# Patient Record
Sex: FEMALE | Race: WHITE | Employment: OTHER | URBAN - METROPOLITAN AREA
[De-identification: names, ages, dates, MRNs, and addresses within clinical notes are randomized per-mention and may not be internally consistent; named-entity substitution may affect disease eponyms.]

---

## 2017-01-03 PROCEDURE — 81001 URINALYSIS AUTO W/SCOPE: CPT | Performed by: INTERNAL MEDICINE

## 2017-01-03 PROCEDURE — 87086 URINE CULTURE/COLONY COUNT: CPT | Performed by: INTERNAL MEDICINE

## 2017-09-22 PROCEDURE — 84425 ASSAY OF VITAMIN B-1: CPT | Performed by: INTERNAL MEDICINE

## 2017-09-22 PROCEDURE — 82607 VITAMIN B-12: CPT | Performed by: INTERNAL MEDICINE

## 2017-09-23 PROBLEM — D51.3 OTHER DIETARY VITAMIN B12 DEFICIENCY ANEMIA: Status: ACTIVE | Noted: 2017-09-23

## 2017-11-22 PROCEDURE — 83540 ASSAY OF IRON: CPT | Performed by: INTERNAL MEDICINE

## 2017-11-22 PROCEDURE — 84425 ASSAY OF VITAMIN B-1: CPT | Performed by: INTERNAL MEDICINE

## 2017-11-22 PROCEDURE — 82607 VITAMIN B-12: CPT | Performed by: INTERNAL MEDICINE

## 2017-11-22 PROCEDURE — 83550 IRON BINDING TEST: CPT | Performed by: INTERNAL MEDICINE

## 2018-01-08 PROCEDURE — 82607 VITAMIN B-12: CPT | Performed by: INTERNAL MEDICINE

## 2018-04-18 PROBLEM — G31.84 MILD COGNITIVE IMPAIRMENT: Status: ACTIVE | Noted: 2018-04-18

## 2018-04-18 PROCEDURE — 82607 VITAMIN B-12: CPT | Performed by: INTERNAL MEDICINE

## 2021-02-09 PROBLEM — I67.9 CEREBRAL VASCULAR DISEASE: Status: ACTIVE | Noted: 2021-02-09

## 2021-02-09 PROBLEM — F01.51 MIXED ALZHEIMER'S AND VASCULAR DEMENTIA WITH BEHAVIOR DISTURBANCES (HCC): Status: ACTIVE | Noted: 2021-02-09

## 2021-02-09 PROBLEM — G30.9 MIXED ALZHEIMER'S AND VASCULAR DEMENTIA WITH BEHAVIOR DISTURBANCES (HCC): Status: ACTIVE | Noted: 2021-02-09

## 2021-08-20 PROBLEM — E46 PROTEIN-CALORIE MALNUTRITION, UNSPECIFIED SEVERITY (HCC): Status: ACTIVE | Noted: 2021-08-20

## 2021-11-08 ENCOUNTER — APPOINTMENT (OUTPATIENT)
Dept: CT IMAGING | Facility: HOSPITAL | Age: 81
DRG: 064 | End: 2021-11-08
Attending: EMERGENCY MEDICINE
Payer: MEDICARE

## 2021-11-08 ENCOUNTER — HOSPITAL ENCOUNTER (INPATIENT)
Facility: HOSPITAL | Age: 81
LOS: 6 days | Discharge: HOME HEALTH | DRG: 064 | End: 2021-11-14
Attending: EMERGENCY MEDICINE | Admitting: HOSPITALIST
Payer: MEDICARE

## 2021-11-08 DIAGNOSIS — I62.9 ACUTE IDIOPATHIC SPONTANEOUS INTRAPARENCHYMAL INTRACRANIAL HEMORRHAGE (HCC): Primary | ICD-10-CM

## 2021-11-08 PROCEDURE — 70450 CT HEAD/BRAIN W/O DYE: CPT | Performed by: EMERGENCY MEDICINE

## 2021-11-08 RX ORDER — BISACODYL 10 MG
10 SUPPOSITORY, RECTAL RECTAL
Status: DISCONTINUED | OUTPATIENT
Start: 2021-11-08 | End: 2021-11-14

## 2021-11-08 RX ORDER — HYDRALAZINE HYDROCHLORIDE 20 MG/ML
10 INJECTION INTRAMUSCULAR; INTRAVENOUS EVERY 4 HOURS PRN
Status: DISCONTINUED | OUTPATIENT
Start: 2021-11-08 | End: 2021-11-14

## 2021-11-08 RX ORDER — LORAZEPAM 2 MG/ML
0.5 INJECTION INTRAMUSCULAR ONCE
Status: COMPLETED | OUTPATIENT
Start: 2021-11-08 | End: 2021-11-08

## 2021-11-08 RX ORDER — POLYETHYLENE GLYCOL 3350 17 G/17G
17 POWDER, FOR SOLUTION ORAL DAILY PRN
Status: DISCONTINUED | OUTPATIENT
Start: 2021-11-08 | End: 2021-11-14

## 2021-11-08 RX ORDER — SODIUM CHLORIDE 9 MG/ML
INJECTION, SOLUTION INTRAVENOUS ONCE
Status: COMPLETED | OUTPATIENT
Start: 2021-11-08 | End: 2021-11-08

## 2021-11-08 RX ORDER — MEMANTINE HYDROCHLORIDE 10 MG/1
10 TABLET ORAL 2 TIMES DAILY
Status: DISCONTINUED | OUTPATIENT
Start: 2021-11-09 | End: 2021-11-14

## 2021-11-08 RX ORDER — MIRTAZAPINE 30 MG/1
30 TABLET, FILM COATED ORAL NIGHTLY
Status: DISCONTINUED | OUTPATIENT
Start: 2021-11-08 | End: 2021-11-14

## 2021-11-08 RX ORDER — LISINOPRIL 20 MG/1
10 TABLET ORAL ONCE
Status: DISCONTINUED | OUTPATIENT
Start: 2021-11-08 | End: 2021-11-09

## 2021-11-08 RX ORDER — LISINOPRIL 10 MG/1
10 TABLET ORAL DAILY
Status: DISCONTINUED | OUTPATIENT
Start: 2021-11-09 | End: 2021-11-09

## 2021-11-08 RX ORDER — MEMANTINE HYDROCHLORIDE 10 MG/1
10 TABLET ORAL ONCE
Status: DISCONTINUED | OUTPATIENT
Start: 2021-11-08 | End: 2021-11-14

## 2021-11-08 RX ORDER — METOCLOPRAMIDE HYDROCHLORIDE 5 MG/ML
5 INJECTION INTRAMUSCULAR; INTRAVENOUS EVERY 8 HOURS PRN
Status: DISCONTINUED | OUTPATIENT
Start: 2021-11-08 | End: 2021-11-14

## 2021-11-08 RX ORDER — CHOLECALCIFEROL (VITAMIN D3) 125 MCG
1000 CAPSULE ORAL DAILY
Status: DISCONTINUED | OUTPATIENT
Start: 2021-11-09 | End: 2021-11-14

## 2021-11-08 RX ORDER — RIVASTIGMINE 13.3 MG/24H
1 PATCH, EXTENDED RELEASE TRANSDERMAL
Status: DISCONTINUED | OUTPATIENT
Start: 2021-11-08 | End: 2021-11-14

## 2021-11-08 RX ORDER — ACETAMINOPHEN 325 MG/1
650 TABLET ORAL EVERY 6 HOURS PRN
Status: DISCONTINUED | OUTPATIENT
Start: 2021-11-08 | End: 2021-11-14

## 2021-11-08 RX ORDER — ONDANSETRON 2 MG/ML
4 INJECTION INTRAMUSCULAR; INTRAVENOUS EVERY 6 HOURS PRN
Status: DISCONTINUED | OUTPATIENT
Start: 2021-11-08 | End: 2021-11-14

## 2021-11-08 RX ORDER — HALOPERIDOL 5 MG/ML
1 INJECTION INTRAMUSCULAR EVERY 4 HOURS PRN
Status: DISCONTINUED | OUTPATIENT
Start: 2021-11-08 | End: 2021-11-14

## 2021-11-08 RX ORDER — ROSUVASTATIN CALCIUM 5 MG/1
5 TABLET, COATED ORAL NIGHTLY
Status: DISCONTINUED | OUTPATIENT
Start: 2021-11-08 | End: 2021-11-14

## 2021-11-08 RX ORDER — ALPRAZOLAM 0.25 MG/1
0.25 TABLET ORAL 3 TIMES DAILY PRN
Status: DISCONTINUED | OUTPATIENT
Start: 2021-11-08 | End: 2021-11-14

## 2021-11-08 NOTE — ED INITIAL ASSESSMENT (HPI)
Génesis Rivera is here via EMS for AMS. Home health RN called for AMS and shaking. Patient is typically A&Ox1 but was alert to verbal per EMS initially. More responsive now. She seems agitated and is unable to provide and history.

## 2021-11-08 NOTE — ED PROVIDER NOTES
Patient Seen in: [facility] AND [facility] Emergency Department      History   Patient presents with:  Altered Mental Status    Stated Complaint: AMS    Subjective:   HPI    57-year-old female with dementia presents for evaluation for altered mental status.   Thuy Palumbo Appearance: She is well-developed. HENT:      Head: Normocephalic and atraumatic. Mouth/Throat:      Mouth: Mucous membranes are dry. Eyes:      General: Lids are normal.      Extraocular Movements: Extraocular movements intact.    Cardiovascular: (*)     Alkaline Phosphatase 278 (*)     Albumin 3.1 (*)     All other components within normal limits   CBC W/ DIFFERENTIAL - Abnormal; Notable for the following components:    HGB 11.9 (*)     RDW-SD 47.8 (*)     Lymphocyte Absolute 0.70 (*)     All othe extra-axial fluid collection. CEREBRUM: Evolving subacute intraparenchymal hemorrhage is suggested in the posterior right temporal lobe, measuring 2.0 x 3.1 x 1.8 cm. Surrounding vasogenic edema is seen, and cortical sulcal effacement is apparent.  There is this examination were discussed with the patient's physician, Dr. Saray Hwang, by Dr. Anurag Cuba at 95 20 92 on 11/08/2021.    Dictated by (CST): Heena Anderson MD on 11/08/2021 at 11:46 AM     Finalized by (CST): Heena Anderson MD on 11/08/2021 at 11:50 AM

## 2021-11-08 NOTE — H&P
[facility] Hospitalist H&P     CC: Patient presents with:  Altered Mental Status       PCP: Janet Junior MD      Assessment and Plan     Susannah Montiel is a 80year old female with PMH sig for HTN, HL, dementia (oriented to preson, family at times) who present PMH  Past Medical History:   Diagnosis Date   • Essential hypertension    • HYPERLIPIDEMIA    • OTHER DISEASES     vertigo        PSH  Past Surgical History:   Procedure Laterality Date   • BENIGN BIOPSY LEFT  1996   • OTHER SURGICAL HISTORY  2004    l 11/8/2021  CONCLUSION:  1. Acute/subacute intraparenchymal hemorrhage is seen in the right temporal lobe with surrounding vasogenic edema. This could reflect a hemorrhagic contusion or, potentially, intralesional hemorrhage of an underlying mass.  Close fol

## 2021-11-09 ENCOUNTER — APPOINTMENT (OUTPATIENT)
Dept: CT IMAGING | Facility: HOSPITAL | Age: 81
DRG: 064 | End: 2021-11-09
Attending: HOSPITALIST
Payer: MEDICARE

## 2021-11-09 PROCEDURE — 95816 EEG AWAKE AND DROWSY: CPT | Performed by: OTHER

## 2021-11-09 PROCEDURE — 70450 CT HEAD/BRAIN W/O DYE: CPT | Performed by: HOSPITALIST

## 2021-11-09 PROCEDURE — 99223 1ST HOSP IP/OBS HIGH 75: CPT | Performed by: OTHER

## 2021-11-09 RX ORDER — METOPROLOL TARTRATE 5 MG/5ML
5 INJECTION INTRAVENOUS EVERY 6 HOURS
Status: DISCONTINUED | OUTPATIENT
Start: 2021-11-09 | End: 2021-11-09

## 2021-11-09 RX ORDER — LISINOPRIL 20 MG/1
20 TABLET ORAL DAILY
Status: DISCONTINUED | OUTPATIENT
Start: 2021-11-09 | End: 2021-11-14

## 2021-11-09 RX ORDER — SODIUM CHLORIDE 9 MG/ML
INJECTION, SOLUTION INTRAVENOUS CONTINUOUS
Status: DISCONTINUED | OUTPATIENT
Start: 2021-11-09 | End: 2021-11-09

## 2021-11-09 NOTE — PLAN OF CARE
Patient Aox1. Agitated. Vitals stable. New admit. Neuro checks. At baseline. Just more agitated. Haldol given.      Problem: Patient Centered Care  Goal: Patient preferences are identified and integrated in the patient's plan of care  Description: Enid Schwartz secretions as needed  - Reorient patient post seizure  - Seizure pads on all 4 side rails  - Instruct patient/family to notify RN of any seizure activity  - Instruct patient/family to call for assistance with activity based on assessment  Outcome: Progress

## 2021-11-09 NOTE — PROGRESS NOTES
[facility] Hospitalist Progress Note     PCP: Maritza Hwang MD    CC: Follow up       Assessment/Plan:     Crow London is a 80year old female with PMH sig for HTN, HL, dementia (oriented to preson, family at times) who presented with worsening MS, aggitatio Bere Boucher on the phone on 11/9/21. Requested hospice consult which is appropriate.   Plan would be for home hospice     Thank You,  Marcos Lopez MD  11/9/21   Hospitalist  [facility]   Answering service: [phone]          Subjective     Alert 11.9* 10.5*   MCV 92.2 93.2   .0 216.0       Recent Labs   Lab 11/08/21  0959 11/09/21  0432    145   K 4.2 3.9    116*   CO2 23.0 25.0   BUN 21* 15   CREATSERUM 0.88 0.63   CA 9.8 9.1   MG 2.2  --    * 91       Recent Labs   Lab

## 2021-11-09 NOTE — PLAN OF CARE
Bilateral wrist restraints in place, ROM completed every two hours.       Problem: Safety Risk - Non-Violent Restraints  Goal: Patient will remain free from self-harm  Description: INTERVENTIONS:  - Apply the least restrictive restraint to prevent harm  - N

## 2021-11-09 NOTE — PLAN OF CARE
Patient received in restless state, attempting to get up, remove medical equipment and very confused (per baseline). Patient placed in restraints during shift. Bilateral wrist restraints in place, ROM completed every two hours.  Neuro checks performed Q 4 a administer oxygen as ordered  - Monitor patient for seizure activity, document and report duration and description of seizure to MD/LIP  - If seizure occurs, turn patient to side and suction secretions as needed  - Reorient patient post seizure  - Seizure

## 2021-11-09 NOTE — PHYSICAL THERAPY NOTE
PHYSICAL THERAPY EVALUATION - INPATIENT     Room Number: 239/239-A  Evaluation Date: 11/9/2021  Type of Evaluation: Initial   Physician Order: PT Eval and Treat    Presenting Problem: intraparenchymal hemorrhage (non-operative)     Reason for Therapy:  [facility] '6 clicks' Inpatient Basic Mobility Short Form. Research supports that patients with this level of impairment may benefit from home with 24 hour care and HHPT.         DISCHARGE RECOMMENDATIONS  PT Discharge Recommendations: 24 hour care/supervision; Status:  Impaired  · Orientation Level:  oriented to person  · Memory:  decreased long term memory and decreased short term memory  · Following Commands:  follows one step commands with increased time and follows one step commands with repetition  · Proble steps along EOB  Assistive Device:  (bilateral HHA)  Pattern: Shuffle (flexed posture, tremors)    Bed Mobility: Mod A    Transfers: Min HHA of 2    Exercise/Education Provided:  Bed mobility  Functional activity tolerated  Gait training  Transfer training

## 2021-11-09 NOTE — CONSULTS
Neurosurgery Consult Note    _______________________ PATIENT HISTORY _______________________    CC: Cerebral hemorrhage    HPI: A neurosurgery consult was requested by Dr Sondra Anaya.   The patient is a 81yo WF who was brought to the hospital with altered men Year: Not on file  Transportation Needs:       Lack of Transportation (Medical): Not on file      Lack of Transportation (Non-Medical):  Not on file  Physical Activity:       Days of Exercise per Week: Not on file      Minutes of Exercise per Session: Not o mouth daily. , Disp: , Rfl: 0  Vitamin B-12 1000 MCG Oral Tab, Take 1 tablet (1,000 mcg total) by mouth daily. , Disp: , Rfl: 0  Cholecalciferol (VITAMIN D) 1000 units Oral Tab, Take 1 tablet by mouth daily. , Disp: 30 tablet, Rfl: 0                __________ MCHC 31.5 31.7   RDW 14.0 14.2   NEPRELIM 5.54 4.73   WBC 6.4 6.9   .0 216.0       No results for input(s): PT, INR, PTT in the last 168 hours.       DIAGNOSES:    Right temporal subacute hemorrhage        ASSESSMENT & PLAN:    The patient has abdifatah

## 2021-11-09 NOTE — PROCEDURES
EEG report    REFERRING PHYSICIAN: Mateo Sanchez MD    PCP and phone number:  Quinten Diaz MD  [phone]    TECHNIQUE: 21 channels of EEG, 2 channels of EOG, and 1 channel of EKG were recorded utilizing the International 10/20 System.  The recor

## 2021-11-09 NOTE — CONSULTS
[facility]    Report of Consultation    Lura Barthel Patient Status:  Inpatient    3/6/1940 MRN [id number]   Location [facility] 2W/SW Attending Jakob Pacheco MD   [facility] Day # 1 PCP Shannon Silverman MD     Date of Admission:  6 vertigo       Past Surgical History  Past Surgical History:   Procedure Laterality Date   • BENIGN BIOPSY LEFT  1996   • OTHER SURGICAL HISTORY  2004    left shoulder replacement   • OTHER SURGICAL HISTORY  08/09/10    Mohs sx Left Restoration bx proven BCC NEEDED  lisinopril 10 MG Oral Tab, Take 1 tablet (10 mg total) by mouth daily. mirtazapine 30 MG Oral Tab, Take 1 tablet (30 mg total) by mouth nightly. rivastigmine (EXELON) 13.3 MG/24HR Transdermal Patch 24 Hr, Place 1 patch onto the skin 1 DAY.   Rosuv symmetrically.       Results:     Laboratory Data:  Lab Results   Component Value Date    WBC 6.9 11/09/2021    HGB 10.5 (L) 11/09/2021    HCT 33.1 (L) 11/09/2021    .0 11/09/2021    CREATSERUM 0.63 11/09/2021    BUN 15 11/09/2021     11/09/202 with systolic below 702. Supportive management. I discussed the case with the daughter on the phone.   Depending on the food intake ability might consider treatment of palliative care in the future        Thank you for allowing me to participate in the ca

## 2021-11-09 NOTE — OCCUPATIONAL THERAPY NOTE
OCCUPATIONAL THERAPY EVALUATION - INPATIENT     Room Number: 239/239-A  Evaluation Date: 11/9/2021  Type of Evaluation: Initial  Presenting Problem: intraparenchymal hemorrhage    Physician Order: IP Consult to Occupational Therapy  Reason for Therapy: ADL place and alarm on.  Recommendation at this time is for return to home w/ 24/7 supervision and adssist      The patient's Approx Degree of Impairment: 59.67% has been calculated based on documentation in the [facility] '6 clicks' Inpatient Daily Activity Short F 1    Communication: intelligible, but disjointed in context/content    Behavioral/Emotional/Social: pleasant    RANGE OF MOTION   Upper extremity ROM is within functional limits     STRENGTH ASSESSMENT  Upper extremity strength is within functional limits 11/16/21  Frequency: 3x/week

## 2021-11-09 NOTE — CM/SW NOTE
11/09/21 1100   CM/SW Referral Data   Referral Source Physician   Reason for Referral Discharge planning   Informant Daughter   Pertinent Medical Hx   Does patient have an established PCP? Yes   Patient Info   Advanced directives?  Yes   Number of Levels spouse has advanced Parkinson's Disease and is unable to serve as her HCPOA. Bere Sander is listed as the [facility] health care agents.    HCPOA/DTR - Юлия Melendez: [phone]    PLAN: pending medical course, therapy recs, SNF (acceptance/choice)    @4:00PM  SW

## 2021-11-09 NOTE — SLP NOTE
ADULT SWALLOWING EVALUATION    ASSESSMENT    ASSESSMENT/OVERALL IMPRESSION:  PPE REQUIRED. THIS THERAPIST WORE GLOVES, DROPLET MASK, AND GOGGLES FOR DURATION OF EVALUATION. HANDS WASHED UPON ENTRANCE/EXIT. SLP BSSE orders received and acknowledged.  LORIE luciano consistencies;Small bites and sips;Multiple swallows  Aspiration Precautions: Upright position; Slow rate;Small bites and sips; No straw  Medication Administration Recommendations: One pill at a time  Treatment Plan/Recommendations: Aspiration precautions  D of Motion:  (reduced)  Rate of Motion: Reduced    Voice Quality: Clear  Respiratory Status: Supplemental O2;Nasal cannula  Consistencies Trialed: Nectar thick liquids;Puree  Method of Presentation: Staff/Clinician assistance;Spoon;Cup;Single sips  Patient 11/10/21    Thank you for your referral.   If you have any questions, please contact Brittany Vieira, NORBERTO Estrada  Speech Language Pathologist  Phone Number [phone]. [phone]

## 2021-11-10 PROCEDURE — 99232 SBSQ HOSP IP/OBS MODERATE 35: CPT | Performed by: OTHER

## 2021-11-10 RX ORDER — LORAZEPAM 2 MG/ML
1 INJECTION INTRAMUSCULAR
Status: DISCONTINUED | OUTPATIENT
Start: 2021-11-10 | End: 2021-11-14

## 2021-11-10 RX ORDER — POLYETHYLENE GLYCOL 3350 17 G/17G
17 POWDER, FOR SOLUTION ORAL DAILY PRN
Status: DISCONTINUED | OUTPATIENT
Start: 2021-11-10 | End: 2021-11-14

## 2021-11-10 RX ORDER — LABETALOL HYDROCHLORIDE 5 MG/ML
10 INJECTION, SOLUTION INTRAVENOUS EVERY 10 MIN PRN
Status: DISCONTINUED | OUTPATIENT
Start: 2021-11-10 | End: 2021-11-14

## 2021-11-10 RX ORDER — MORPHINE SULFATE 2 MG/ML
1 INJECTION, SOLUTION INTRAMUSCULAR; INTRAVENOUS EVERY 2 HOUR PRN
Status: DISCONTINUED | OUTPATIENT
Start: 2021-11-10 | End: 2021-11-14

## 2021-11-10 RX ORDER — ACETAMINOPHEN 325 MG/1
650 TABLET ORAL EVERY 4 HOURS PRN
Status: DISCONTINUED | OUTPATIENT
Start: 2021-11-10 | End: 2021-11-14

## 2021-11-10 RX ORDER — BISACODYL 10 MG
10 SUPPOSITORY, RECTAL RECTAL
Status: DISCONTINUED | OUTPATIENT
Start: 2021-11-10 | End: 2021-11-14

## 2021-11-10 RX ORDER — ACETAMINOPHEN 650 MG/1
650 SUPPOSITORY RECTAL EVERY 4 HOURS PRN
Status: DISCONTINUED | OUTPATIENT
Start: 2021-11-10 | End: 2021-11-14

## 2021-11-10 RX ORDER — SODIUM CHLORIDE 9 MG/ML
INJECTION, SOLUTION INTRAVENOUS CONTINUOUS
Status: DISCONTINUED | OUTPATIENT
Start: 2021-11-10 | End: 2021-11-10

## 2021-11-10 RX ORDER — MORPHINE SULFATE 2 MG/ML
2 INJECTION, SOLUTION INTRAMUSCULAR; INTRAVENOUS EVERY 2 HOUR PRN
Status: DISCONTINUED | OUTPATIENT
Start: 2021-11-10 | End: 2021-11-14

## 2021-11-10 RX ORDER — SODIUM PHOSPHATE, DIBASIC AND SODIUM PHOSPHATE, MONOBASIC 7; 19 G/133ML; G/133ML
1 ENEMA RECTAL ONCE AS NEEDED
Status: DISCONTINUED | OUTPATIENT
Start: 2021-11-10 | End: 2021-11-14

## 2021-11-10 RX ORDER — DOCUSATE SODIUM 100 MG/1
100 CAPSULE, LIQUID FILLED ORAL 2 TIMES DAILY
Status: DISCONTINUED | OUTPATIENT
Start: 2021-11-10 | End: 2021-11-14

## 2021-11-10 RX ORDER — HYDRALAZINE HYDROCHLORIDE 20 MG/ML
10 INJECTION INTRAMUSCULAR; INTRAVENOUS EVERY 2 HOUR PRN
Status: DISCONTINUED | OUTPATIENT
Start: 2021-11-10 | End: 2021-11-14

## 2021-11-10 NOTE — PROGRESS NOTES
Double RN skin check done prior to transfer off unit. Skin check performed by this RN and Citlaly. Patient is free of wounds, a protective Aquacel was p[laced over sacrum, receiving RN, Reny Harding, notified in report.  Will remain available for any questions or conc

## 2021-11-10 NOTE — PLAN OF CARE
Patient alert and calm. Generalized tremors. Oriented to person, disoriented to time, place, and situation. Protective sacral mepilex in place. Patient became anxious and agitated towards end of shift, haldol provided.   Restraints in place throughout s Risk - Non-Violent Restraints  Goal: Patient will remain free from self-harm  Description: INTERVENTIONS:  - Apply the least restrictive restraint to prevent harm  - Notify patient and family of reasons restraints applied  - Assess for any contributing fac

## 2021-11-10 NOTE — CM/SW NOTE
SW received MDO for HH eval. SW initiated tentative [facility] referrals. SW to enter F2F if pt/family chooses HH. Of note, [facility] plans to call pt's daughter/POA today @ 11:30AM to discuss hospice.     @12:24 PM  Per Dhara Holley with [facility]

## 2021-11-10 NOTE — PROGRESS NOTES
[facility] Hospitalist Progress Note     PCP: Basil Isabel MD    CC: Follow up       Assessment/Plan:     Sawyer Cruz is a 80year old female with PMH sig for HTN, HL, dementia (oriented to preson, family at times) who presented with worsening MS, aggitatio MD  [facility] [address] [facility]   Answering service: [phone]          Subjective     Alert, confused,   Calm this morning, denies pain      Objective     OBJECTIVE:  Temp:  [97.7 °F (36.5 °C)-99.6 °F (37.6 °C)] 97.7 °F (36.5 °C)  Pulse:  [21520 6.9 8.0   HGB 11.9* 10.5* 10.3*   MCV 92.2 93.2 93.7   .0 216.0 229.0       Recent Labs   Lab 11/08/21  0959 11/09/21  0432 11/10/21  0655    145 143   K 4.2 3.9 3.5    116* 113*   CO2 23.0 25.0 27.0   BUN 21* 15 18   CREATSERUM 0.88 0.6 above.    Results of this examination were discussed with the patient's physician, Dr. Kinjal Pryor, by Dr. Meghan Alexandre at 95 20 92 on 11/08/2021.    Dictated by (CST): Radha Gerardo MD on 11/08/2021 at 11:46 AM     Finalized by (CST): Radha Gerardo MD on 11/08/2021

## 2021-11-10 NOTE — PLAN OF CARE
Patient remains alert and oriented X 1 per baseline. Resting comfortably in bed with 2 L NC.      Problem: Patient Centered Care  Goal: Patient preferences are identified and integrated in the patient's plan of care  Description: Interventions:  - What woul Reorient patient post seizure  - Seizure pads on all 4 side rails  - Instruct patient/family to notify RN of any seizure activity  - Instruct patient/family to call for assistance with activity based on assessment  Outcome: Progressing  Goal: Achieves maxi

## 2021-11-10 NOTE — DIETARY NOTE
ADULT NUTRITION INITIAL ASSESSMENT    Pt is at moderate nutrition risk. Pt meets moderate malnutrition criteria.       CRITERIA FOR MALNUTRITION DIAGNOSIS:  Criteria for severe malnutrition diagnosis: acute illness/injury related to body fat moderate deple nutrition care to new setting or provider: monitor plans--plan for discharge home with hospice on 11/14/21    ANTHROPOMETRICS:  HT: 165.1 cm (5' 5\")  WT: 50 kg (110 lb 3.2 oz)   BMI: Body mass index is 18.34 kg/m².   BMI CLASSIFICATION: less than 19 kg/m2 MG 2.2  --   --     145 143   K 4.2 3.9 3.5    116* 113*   CO2 23.0 25.0 27.0   OSMOCALC 303* 300* 298*     NUTRITION RELATED PHYSICAL FINDINGS:  - Nutrition Focused Physical Exam(NFPE): mild depletion body fat orbital region, moderate deplet

## 2021-11-10 NOTE — PROGRESS NOTES
[facility]  Neurology Progress Note    Pedro Madden Patient Status:  Inpatient    3/6/1940 MRN [id number]   Location [facility] 3W/SW Attending Elmyra Nageotte, MD   [facility] Day # 2 PCP Shena Ward MD     Subjective:  Pedro Madden is a(n) 80 y Or  acetaminophen (Tylenol) rectal suppository 650 mg, 650 mg, Rectal, Q4H PRN  morphINE sulfate (PF) 2 MG/ML injection 1 mg, 1 mg, Intravenous, Q2H PRN   Or  morphINE sulfate (PF) 2 MG/ML injection 2 mg, 2 mg, Intravenous, Q2H PRN  docusate sodium (COLACE breastfeeding.     Physical Exam:   11/10/21  0108 11/10/21  0430 11/10/21  0502 11/10/21  0558   BP: 122/62  117/51    Pulse: 75  79    Resp: 20  20    Temp:   97.7 °F (36.5 °C)    TempSrc:   Axillary    SpO2: 100%  99%    Weight: 106 lb 6.4 oz (48.3 kg) 1 evolving subacute intraparenchymal hemorrhage. Differential diagnostic considerations include posttraumatic hemorrhagic contusion along the right petrous apex or, potentially, underlying mass or lesion with hemorrhagic conversion.  Follow-up MRI with and wi intraparenchymal intracranial hemorrhage (HCC)      Acute idiopathic spontaneous intraparenchymal intracranial hemorrhage ([facility] [address]  Patient with a history of relatively advanced dementia. Alzheimer's, vascular dementia, Lewy body disease are considered.   MRI

## 2021-11-10 NOTE — PLAN OF CARE
Problem: Safety Risk - Non-Violent Restraints  Goal: Patient will remain free from self-harm  Description: INTERVENTIONS:  - Apply the least restrictive restraint to prevent harm  - Notify patient and family of reasons restraints applied  - Assess for an from PT/OT  - Encourage visually impaired, hearing impaired and aphasic patients to use assistive/communication devices  Outcome: Progressing     Problem: Patient/Family Goals  Goal: Patient/Family Long Term Goal  Description: Patient's Long Term Goal: Go

## 2021-11-10 NOTE — HOSPICE RN NOTE
[facility] information session held with daughter and Desire Mcgee over the phone. Hospice benefit coverage discussed, goals of care discussed. All questions encouraged and answered.  Plan is to d'c home Sunday 11/14/2021 due to daughter not being ab

## 2021-11-10 NOTE — PLAN OF CARE
Bed alarm armed, side rails up x2, video monitoring initiated. Turns q2. Hospice meeting completed w/ daughter. See hospice note.     Problem: Patient Centered Care  Goal: Patient preferences are identified and integrated in the patient's plan of care  Yinka ordered  11/10/2021 1324 by Lizeth Hernandez, RN  Outcome: Progressing  11/10/2021 1309 by Lizeth Hernandez, RN  Outcome: Progressing  Goal: Remains free of injury related to seizure activity  Description: INTERVENTIONS:  - Maintain airway, patient saf restraints  11/10/2021 1324 by Narendra Hernandez RN  Outcome: Progressing  11/10/2021 1309 by Narendra Hernandez RN  Outcome: Progressing

## 2021-11-11 PROCEDURE — 99231 SBSQ HOSP IP/OBS SF/LOW 25: CPT | Performed by: OTHER

## 2021-11-11 RX ORDER — FAMOTIDINE 10 MG/ML
20 INJECTION, SOLUTION INTRAVENOUS DAILY
Status: DISCONTINUED | OUTPATIENT
Start: 2021-11-11 | End: 2021-11-14

## 2021-11-11 RX ORDER — FAMOTIDINE 20 MG/1
20 TABLET ORAL DAILY
Status: DISCONTINUED | OUTPATIENT
Start: 2021-11-11 | End: 2021-11-14

## 2021-11-11 NOTE — PROGRESS NOTES
[facility] Hospitalist Progress Note     PCP: Shannon Silverman MD    CC: Follow up       Assessment/Plan:     Lura Barthel is a 80year old female with PMH sig for HTN, HL, dementia (oriented to preson, family at times) who presented with worsening MS, aggitatio MD  [facility] [place] [address] [facility]   Answering service: [phone]      Subjective     Alert,    Calm this morning, denies pain      Objective     OBJECTIVE:  Temp:  [97.5 °F (36.4 °C)-98.2 °F (36.8 °C)] 98.1 °F (36.7 °C)  Pulse:  [73-85] 73  Resp:  [ HCl    Data Review:       Labs:     Recent Labs   Lab 11/08/21  0959 11/09/21  0432 11/10/21  0655   WBC 6.4 6.9 8.0   HGB 11.9* 10.5* 10.3*   MCV 92.2 93.2 93.7   .0 216.0 229.0       Recent Labs   Lab 11/08/21  0959 11/09/21  0432 11/10/21  9801 of chronic microvascular ischemic disease. There is also large vessel atherosclerosis. 3. Lesser incidental findings as above. Results of this examination were discussed with the patient's physician, Dr. Jude Johns, by Dr. Armando Guillaume at 95 20 92 on 11/08/2021.

## 2021-11-11 NOTE — CM/SW NOTE
Received call from pt's dtr [facility] inquiring if pt is able to ambulate and/or communicate. [facility] also inquiring if SW needed any thing additional from her or pt's family. ANA MARIA briefly explained what was documented in PT/OT notes from 11/9.  SW informed pt's d

## 2021-11-11 NOTE — PHYSICAL THERAPY NOTE
Chart reviewed. Patient has a discharge plan to return home on Sunday with hospice. Will hold therapy services this date. Will follow up as is needed. RN aware.

## 2021-11-11 NOTE — PROGRESS NOTES
[facility]  Neurology Progress Note    Rita Love Patient Status:  Inpatient    3/6/1940 MRN [id number]   Location [facility] 3W/SW Attending Maynor Terry MD   [facility] Day # 3 PCP Jhon Kim MD     Subjective:  Rita Love is a(n) 80 y PRN  acetaminophen (TYLENOL) tab 650 mg, 650 mg, Oral, Q4H PRN   Or  acetaminophen (Tylenol) rectal suppository 650 mg, 650 mg, Rectal, Q4H PRN  morphINE sulfate (PF) 2 MG/ML injection 1 mg, 1 mg, Intravenous, Q2H PRN   Or  morphINE sulfate (PF) 2 MG/ML in 111 lb 8 oz (50.6 kg), SpO2 99 %, not currently breastfeeding. Physical Exam:   11/10/21  1734 11/10/21  2050 11/11/21  0500 11/11/21  0507   BP: 133/68 124/66  116/54   Pulse: 85 77  73   Resp: 20 18  18   Temp: 98.2 °F (36.8 °C) 97.5 °F (36.4 °C)  98. surrounding vasogenic edema, suspected to reflect evolving subacute intraparenchymal hemorrhage.  Differential diagnostic considerations include posttraumatic hemorrhagic contusion along the right petrous apex or, potentially, underlying mass or lesion with

## 2021-11-11 NOTE — OCCUPATIONAL THERAPY NOTE
Pt has a discharge plan to return home on Sunday with hospice. OT will hold services this date however remain available as a resource to pt/family if indicated during hospital stay.

## 2021-11-11 NOTE — PLAN OF CARE
Pt is alert and oriented x1. Terry in place. Going home with hospice 11/14.    Problem: Patient Centered Care  Goal: Patient preferences are identified and integrated in the patient's plan of care  Description: Interventions:  - What would you like us to kn Reorient patient post seizure  - Seizure pads on all 4 side rails  - Instruct patient/family to notify RN of any seizure activity  - Instruct patient/family to call for assistance with activity based on assessment  Outcome: Progressing  Goal: Achieves maxi

## 2021-11-12 NOTE — PLAN OF CARE
Pt is alert and oriented x1. Agitated overnight, trying to get out of bed, PRN ativan given, effective. Terry intact.    Problem: Patient Centered Care  Goal: Patient preferences are identified and integrated in the patient's plan of care  Description: Inte side and suction secretions as needed  - Reorient patient post seizure  - Seizure pads on all 4 side rails  - Instruct patient/family to notify RN of any seizure activity  - Instruct patient/family to call for assistance with activity based on assessment

## 2021-11-12 NOTE — PROGRESS NOTES
[facility] Hospitalist Progress Note     PCP: Yonatan Stephens MD    CC: Follow up       Assessment/Plan:     eRgla Jones is a 80year old female with PMH sig for HTN, HL, dementia (oriented to preson, family at times) who presented with worsening MS, aggitatio Alert,    Calm this morning, denies pain      Objective     OBJECTIVE:  Temp:  [97.5 °F (36.4 °C)-98.7 °F (37.1 °C)] 98.2 °F (36.8 °C)  Pulse:  [77-98] 77  Resp:  [16-18] 16  BP: ()/() 150/77    Intake/Output:    Intake/Output Summary (Last 11/10/21  0655   WBC 6.4 6.9 8.0   HGB 11.9* 10.5* 10.3*   MCV 92.2 93.2 93.7   .0 216.0 229.0       Recent Labs   Lab 11/08/21  0959 11/09/21  0432 11/10/21  0655    145 143   K 4.2 3.9 3.5    116* 113*   CO2 23.0 25.0 27.0   BUN 21* 15 atherosclerosis. 3. Lesser incidental findings as above. Results of this examination were discussed with the patient's physician, Dr. Tasneem Luciano, by Dr. Ernesto Griffin at 95 20 92 on 11/08/2021.    Dictated by (CST): Alexsander Lord MD on 11/08/2021 at 11:46 AM

## 2021-11-12 NOTE — PLAN OF CARE
Patient is confused, oriented to self only. RA. Up with assist. Terry in place; to stay long-term. Nectar thick and ground diet. Discharge planned for Sunday. Will continue to monitor.     Problem: Patient Centered Care  Goal: Patient preferences are identi description of seizure to MD/LIP  - If seizure occurs, turn patient to side and suction secretions as needed  - Reorient patient post seizure  - Seizure pads on all 4 side rails  - Instruct patient/family to notify RN of any seizure activity  - Instruct pa

## 2021-11-12 NOTE — HOSPICE RN NOTE
[facility] will be admitting patient on 11/14/2021 . She will get picked up at 11am on 11/14/2021.

## 2021-11-13 NOTE — PLAN OF CARE
Problem: Patient Centered Care  Goal: Patient preferences are identified and integrated in the patient's plan of care  Description: Interventions:  - What would you like us to know as we care for you?  Patient has dementia  - Provide timely, complete, and patient/family to notify RN of any seizure activity  - Instruct patient/family to call for assistance with activity based on assessment  Outcome: Progressing  Goal: Achieves maximal functionality and self care  Description: INTERVENTIONS  - Monitor swallow Problem: SAFETY ADULT - FALL  Goal: Free from fall injury  Description: INTERVENTIONS:  - Assess pt frequently for physical needs  - Identify cognitive and physical deficits and behaviors that affect risk of falls.   - [place] fall precautions as indica

## 2021-11-13 NOTE — PROGRESS NOTES
[facility] Hospitalist Progress Note     PCP: Quinten Diaz MD    CC: Follow up       Assessment/Plan:     Fanny Lamb is a 80year old female with PMH sig for HTN, HL, dementia (oriented to preson, family at times) who presented with worsening MS, aggitatio Alert, complains of L sided rib pain.      Objective     OBJECTIVE:  Temp:  [97.6 °F (36.4 °C)-97.9 °F (36.6 °C)] 97.6 °F (36.4 °C)  Pulse:  [] 84  Resp:  [18] 18  BP: (134-156)/(78-94) 153/79    Intake/Output:    Intake/Output Summary (Last 24 ho 11/10/21  0655    145 143   K 4.2 3.9 3.5    116* 113*   CO2 23.0 25.0 27.0   BUN 21* 15 18   CREATSERUM 0.88 0.63 0.67   CA 9.8 9.1 9.3   MG 2.2  --   --    * 91 96       Recent Labs   Lab 11/08/21  0959   ALT 36   AST 39*   ALB 3.1* 1148 on 11/08/2021.    Dictated by (CST): Alicia Ojeda MD on 11/08/2021 at 11:46 AM     Finalized by (CST): Alicia Ojeda MD on 11/08/2021 at 11:50 AM

## 2021-11-13 NOTE — PHYSICAL THERAPY NOTE
Checked chart today and spoke to RN, Elizabeth Gale. Noted plan for home hospice 11/14 a.m. Asked RN if there are any PT needs at this time and he said there are not. Will sign off for now. Discharge acute PT. Thank you.

## 2021-11-14 VITALS
HEIGHT: 65 IN | SYSTOLIC BLOOD PRESSURE: 128 MMHG | HEART RATE: 90 BPM | OXYGEN SATURATION: 95 % | RESPIRATION RATE: 18 BRPM | BODY MASS INDEX: 17.8 KG/M2 | TEMPERATURE: 99 F | DIASTOLIC BLOOD PRESSURE: 67 MMHG | WEIGHT: 106.81 LBS

## 2021-11-14 NOTE — PLAN OF CARE
Confused and impulsive and restless overnight. PRN xanax given. No tele and plan is home with hospice once medically stable. Pain noted and as needed morphine utilized when necessary.       Problem: NEUROLOGICAL - ADULT  Goal: Achieves stable or improved (electrolyte disturbances, delirium, medications)  - Discontinue any unnecessary medical devices as soon as possible  - Assess the patient's physical comfort, circulation, skin condition, hydration, nutrition and elimination needs   - Reorient and redirect

## 2021-11-14 NOTE — PROGRESS NOTES
All signatures obtained for POLST form and POLST placed in patient's chart. Copies given to ambulance and copy given for family. Ambulance here to transport patient home. PIV removed. Patient is comfortable at this time.  Terry in place and draining wel

## 2021-11-14 NOTE — DISCHARGE SUMMARY
General Medicine Discharge Summary     Patient ID:  Robson Cowart  80year old  3/6/1940    Admit date: 11/8/2021    Discharge date and time: 11/14/21    Attending Physician: Dawit Alvarez MD     Primary Care Physician: Tara Dumont MD     Reason fo Neurology, reviewed prior MRI and no signs of amyloid on that scan, unable to get MRI here due to agitation and per d.w family, no sedation for MRI as minnie would not ,  Stop aspirin indefinitely.     -plan for home with hospice      [facility] [place] [address]    Hours: 24-hours Phone: [phone]   · ALPRAZolam 0.25 MG Tabs         Activity: activity as tolerated  Diet: regular diet  Wound Care: none needed  Code Status: DNAR/Selective Treatment  O2: n/a    Follow-up with:    PCP   Spe

## 2024-02-21 NOTE — SLP NOTE
ADULT SWALLOWING EVALUATION    ASSESSMENT & PLAN     PPE REQUIRED. THIS THERAPIST WORE GLOVES, DROPLET MASK, AND GOGGLES FOR DURATION OF EVALUATION. HANDS WASHED UPON ENTRANCE/EXIT. SLP seen for dysphagia therapy to trial upgrade in diet.   RN reported c Continue home Lopressor 12.5 twice daily   thick liquids/ Mildly thick    Compensatory Strategies Recommended: No straws; Slow rate;Small bites and sips;Multiple swallows  Aspiration Precautions: Upright position; Slow rate;Small bites and sips; No straw  Medication Administration Recommendations:  One hard palate until soft and dissolved. Pt then completed bolus prep and propulsion, clearing the oral cavity with no residuals after the swallow. No overt clinical s/s of aspiration or respiratory compromise with assessed trials.   Rec diet upgrade to M&M

## (undated) NOTE — IP AVS SNAPSHOT
[facility]            (For Outpatient Use Only) Initial Admit Date: 11/8/2021   Inpt/Obs Admit Date: Inpt: 11/8/21 / Obs: N/A   Discharge Date:    Carylon Kawasaki:  [de-identified]   MRN: [de-identified]   CSN: [id number]   CEID: [id number]        [employer] INSURANCE   Payor:  Plan:  FOR LIFE   Group Number:  Insurance Type: INDEMNITY   Subscriber Name: Bronwyn Reveles : 1939   Subscriber ID: [id number] Pt Rel to Subscriber: [facility] Account Financial Class: Medicare

## (undated) NOTE — IP AVS SNAPSHOT
Patient Demographics     Address  [address] [place] [address] Anay Camilo [address] Phone  [phone] [facility])  [phone] (Mobile) *Preferred* E-mail Address  [email or web address]      Emergency Contact(s)     Name Relation Home Work Mobile    Tatum Metcalf    [id number] nightly. Kadeem Ryan MD   [    ]   [    ]   [    ]   [    ]     Vitamin D 1000 units Tabs      Take 1 tablet by mouth daily.    Kadeem Ryan MD   [    ]   [    ]   [    ]   [    ]           Where to Get Your Medications      These medications were se 11/14/2021 0632   Pulse 95 Filed at 11/14/2021 0632   Resp 18 Filed at 11/14/2021 0632   Temp 98.5 °F (36.9 °C) Filed at 11/14/2021 0632   SpO2 95 % Filed at 11/14/2021 1808      Patient's Most Recent Weight       Most Recent Value   Patient Weight 48.4 kg PPX; SCD, hold right ear    Code: DNAR select per dw DTR nadia on the phone    Dispo pending course     Outpatient records reviewed confirming patient's medical history and medications. Further recommendations pending patient's clinical course.   ALFREDA Onset   • Hypertension Mother    • Cancer Brother         bladder ca   • Breast Cancer Maternal Aunt 54        age 54           Objective     Temp: 80 °F (36.7 °C)  Pulse: 89  Resp: 22  BP: 136/71    Exam  Gen: No acute distress, alert and oriented x perso Electronically signed by Yeison Mccarty MD on 11/8/2021  3:19 PM              Consults - MD Consult Notes      Consults signed by Sheila Garcia MD at 11/9/2021 11:15 AM     Author: Sheila Garcia MD Service: Neurosurgery Author Type: Physician Stress Concern: Not Asked        Weight Concern: Not Asked        Special Diet: Not Asked        Back Care: Not Asked        Exercise: Not Asked        Bike Helmet: Not Asked        Seat Belt: Yes        Self-Exams: Not Asked    Social History Narrative 3  mirtazapine 30 MG Oral Tab, Take 1 tablet (30 mg total) by mouth nightly., Disp: 90 tablet, Rfl: 3  rivastigmine (EXELON) 13.3 MG/24HR Transdermal Patch 24 Hr, Place 1 patch onto the skin 1 DAY. , Disp: 90 patch, Rfl: 0  Rosuvastatin Calcium (CRESTOR) 5 Adri Starr MD on 11/08/2021 at 11:46 AM     Finalized by (CST): Pedro Casillas MD on 11/08/2021 at 11:50 AM               MRI:           Bone scan:      Discogram:      Myelogram:    Recent Labs   Lab 11/08/21  0959 11/09/21  0432   * 91   BUN 21* 15 Faustina Herndon PT (Physical Therapist)       Chart reviewed. Patient has a discharge plan to return home on Sunday with hospice. Will hold therapy services this date. Will follow up as is needed. RN aware.              Occupational Therapy Notes (last 67 bryan mouth or reported sensation in the pharynx after each trial.  Recommend trial of upgrade in diet to Minced and moist foods. Thin liquid trials attempted in 1/2 and 1/4 tsp amounts.   Immediate s/ sof aspiration assessed characterized by cough, wet/gurgly v without overt s/s of aspiration with 100% accuracy over 2 sessions.     Partially met  Goal updated 11/11   Goal #2 The patient/family/caregiver will demonstrate understanding and implementation of aspiration precautions and swallow strategies independently Yes  SLP Follow-up Date: 11/12/21  Number of Visits to Meet Established Goals: 2    Session: 3/4    If you have any questions, please contact NORBERTO Marti    Electronically signed by NORBERTO Hensley on 11/11/2021  2:30 PM           Immunizations     Na